# Patient Record
Sex: MALE | Race: OTHER | Employment: STUDENT | URBAN - METROPOLITAN AREA
[De-identification: names, ages, dates, MRNs, and addresses within clinical notes are randomized per-mention and may not be internally consistent; named-entity substitution may affect disease eponyms.]

---

## 2022-05-16 ENCOUNTER — OFFICE VISIT (OUTPATIENT)
Dept: ORTHOPEDIC SURGERY | Age: 22
End: 2022-05-16
Payer: COMMERCIAL

## 2022-05-16 DIAGNOSIS — M25.562 LEFT KNEE PAIN, UNSPECIFIED CHRONICITY: Primary | ICD-10-CM

## 2022-05-16 PROCEDURE — 99204 OFFICE O/P NEW MOD 45 MIN: CPT | Performed by: ORTHOPAEDIC SURGERY

## 2022-05-16 NOTE — PROGRESS NOTES
Date:  2022    Name:  Amaya Kirk  Address:  [address] [place] [address]    :  2000      Age:   23 y.o.    SSN:  [id number]      Medical Record Number:  [id number]    Reason for Visit:    Chief Complaint    Knee Pain (old patient / new problem left knee )      DOS:2022     HPI: Galen Baldwin is a 23 y.o. male here today for left knee pain. Patient reports left knee pain started about 2 weeks ago, he has stepped on a rock and unfortunately his knee gave out and fell down to the ground. He denies hearing any popping sound. He describes his pain as sharp in quality, mostly in the medial joint line, about 5/10 in severity, increases with turning and twisting movement, and getting better with taking rest.  He denies any popping, clicking, or any other mechanical symptoms. He denies any knee instability or giving away symptoms. He has not tried any treatment for his condition. ROS: All systems reviewed on patient intake form. Pertinent items are noted in HPI. No past medical history on file. No past surgical history on file. No family history on file.     Social History     Socioeconomic History    Marital status: Single     Spouse name: Not on file    Number of children: Not on file    Years of education: Not on file    Highest education level: Not on file   Occupational History    Not on file   Tobacco Use    Smoking status: Never Smoker    Smokeless tobacco: Not on file   Substance and Sexual Activity    Alcohol use: Not on file    Drug use: Not on file    Sexual activity: Not on file   Other Topics Concern    Not on file   Social History Narrative    Not on file     Social Determinants of Health     Financial Resource Strain:     Difficulty of Paying Living Expenses: Not on file   Food Insecurity:     Worried About Running Out of Food in the Last Year: Not on file    Michela of Food in the Last Year: Not on file   Transportation Needs:     Lack of Transportation (Medical): Not on file    Lack of Transportation (Non-Medical): Not on file   Physical Activity:     Days of Exercise per Week: Not on file    Minutes of Exercise per Session: Not on file   Stress:     Feeling of Stress : Not on file   Social Connections:     Frequency of Communication with Friends and Family: Not on file    Frequency of Social Gatherings with Friends and Family: Not on file    Attends [employer] Services: Not on file    Active Member of [address] [place] [address] or Organizations: Not on file    Attends Club or Organization Meetings: Not on file    Marital Status: Not on file   Intimate Partner Violence:     Fear of Current or Ex-Partner: Not on file    Emotionally Abused: Not on file    Physically Abused: Not on file    Sexually Abused: Not on file   Housing Stability:     Unable to Pay for Housing in the Last Year: Not on file    Number of [place] in the Last Year: Not on file    Unstable Housing in the Last Year: Not on file       Current Outpatient Medications   Medication Sig Dispense Refill    ibuprofen (ADVIL;MOTRIN) 200 MG tablet Take 400 mg by mouth every 8 hours as needed. No current facility-administered medications for this visit. No Known Allergies    Vital signs: There were no vitals taken for this visit. Left knee exam    Gait: No use of assistive devices. No antalgic gait. Alignment: normal alignment. Inspection/skin: Skin is intact without erythema or ecchymosis. No gross deformity. Palpation: no crepitus. There is no lateral joint line tenderness. There is medial joint line tenderness. Range of Motion: There is full range of motion. Strength: Normal quadriceps development. Effusion: No effusion or swelling present. Ligamentous stability: No cruciate or collateral ligament instability. Neurologic and vascular: Skin is warm and well-perfused. Sensation is intact to light-touch.      Special tests: Positive Kathy sign. Right knee exam    Gait: No use of assistive devices. No antalgic gait. Alignment: normal alignment. Inspection/skin: Skin is intact without erythema or ecchymosis. No gross deformity. Palpation: no crepitus. no joint line tenderness present. Range of Motion: There is full range of motion. Strength: Normal quadriceps development. Effusion: No effusion or swelling present. Ligamentous stability: No cruciate or collateral ligament instability. Neurologic and vascular: Skin is warm and well-perfused. Sensation is intact to light-touch. Special tests: Negative Kathy sign. Diagnostics:  Radiology:       Radiographs were obtained and reviewed in the office; 4 views: bilateral PA, bilateral Ross, bilateral Merchants AND left lateral    Impression: No evidence of fracture or dislocation. No signs of osteoarthritis. Assessment: 23years old male patient with left knee medial meniscus injury for MRI    Plan: Pertinent imaging was reviewed. The etiology, natural history, and treatment options for the disorder were discussed. The roles of activity medication, antiinflammatories, injections, bracing, physical therapy, and surgical interventions were all described to the patient and questions were answered. Patient has left knee injury 2 weeks ago. He was hiking and he stepped of a rock in the wrong way and his knee gave out. He reports significant pain at the medial joint line. Clinical examination shows tenderness at the medial joint line, with positive Kathy's sign. His MCL is intact and his knee is stable. We think his symptoms mostly coming from his medial meniscus injury. He would like to have an MRI on his left knee to see the extent of his medial meniscus injury. Would like to see him back after his MRI. Syd Giron is in agreement with this plan.  All questions were answered to patient's satisfaction and was encouraged to call with any further questions. The patient was advised that NSAID-type medications have several potential side effects that include: gastrointestinal irritation including hemorrhage, renal injury, as well as an increased risk for heart attack and stroke. The patient was asked to take the medication with food and to stop if there is any symptoms of GI upset, including heartburn, nausea, increased gas or diarrhea. I asked the patient to contact their medical provider for vomiting, abdominal pain or black/bloody stools. The patient should have renal function testing per his medical provider periodically if the medication is taken on a regular basis. The patient should be alert for any swelling in the lower extremities and should stop taking the medication immediately and contact their medical provider should this occur. In addition, the patient should stop taking the medication immediately and contact their medical provider should there be any shortness of breath, fatigue and be evaluated in an emergency facility for any chest pain. The patient expresses understanding of these issues and questions were answered. Total time spent for evaluation, education, and development of treatment plan: 45 minutes.     Lake Lainez MD  [facility] Clinical Fellow  5/16/2022    Orders Placed This Encounter   Procedures    XR KNEE LEFT (MIN 4 VIEWS)     Standing Status:   Future     Number of Occurrences:   1     Standing Expiration Date:   5/16/2023     Order Specific Question:   Reason for exam:     Answer:   pain    XR KNEE RIGHT (3 VIEWS)     Standing Status:   Future     Number of Occurrences:   1     Standing Expiration Date:   5/16/2023     Order Specific Question:   Reason for exam:     Answer:   pain    MRI KNEE LEFT WO CONTRAST     Standing Status:   Future     Standing Expiration Date:   5/16/2023     Order Specific Question:   Reason for exam:     Answer:   MRI L KNEE W/3D EVAL MENISCUS TEAR     Order Specific Question:   Reason for exam:     Answer:   [facility] Frankel TO [facility] PACS     Order Specific Question:   What is the sedation requirement? Answer:   None       I attest that I met personally with the patient, performed the described exam, reviewed the radiographic studies and medical records associated with this patient and supervised the services that are described above.      David Ackerman MD